# Patient Record
Sex: MALE | Race: OTHER | HISPANIC OR LATINO | URBAN - METROPOLITAN AREA
[De-identification: names, ages, dates, MRNs, and addresses within clinical notes are randomized per-mention and may not be internally consistent; named-entity substitution may affect disease eponyms.]

---

## 2022-04-04 ENCOUNTER — INPATIENT (INPATIENT)
Facility: HOSPITAL | Age: 42
LOS: 0 days | Discharge: ANOTHER HOSPITAL | DRG: 313 | End: 2022-04-05
Attending: STUDENT IN AN ORGANIZED HEALTH CARE EDUCATION/TRAINING PROGRAM | Admitting: STUDENT IN AN ORGANIZED HEALTH CARE EDUCATION/TRAINING PROGRAM
Payer: MEDICAID

## 2022-04-04 PROCEDURE — 99053 MED SERV 10PM-8AM 24 HR FAC: CPT

## 2022-04-04 PROCEDURE — 99285 EMERGENCY DEPT VISIT HI MDM: CPT

## 2022-04-05 VITALS
SYSTOLIC BLOOD PRESSURE: 103 MMHG | HEART RATE: 81 BPM | TEMPERATURE: 98 F | DIASTOLIC BLOOD PRESSURE: 56 MMHG | RESPIRATION RATE: 17 BRPM | OXYGEN SATURATION: 94 %

## 2022-04-05 VITALS
RESPIRATION RATE: 24 BRPM | SYSTOLIC BLOOD PRESSURE: 113 MMHG | HEART RATE: 84 BPM | HEIGHT: 63 IN | WEIGHT: 249.12 LBS | OXYGEN SATURATION: 97 % | TEMPERATURE: 98 F | DIASTOLIC BLOOD PRESSURE: 71 MMHG

## 2022-04-05 DIAGNOSIS — Q21.3 TETRALOGY OF FALLOT: ICD-10-CM

## 2022-04-05 DIAGNOSIS — R07.89 OTHER CHEST PAIN: ICD-10-CM

## 2022-04-05 DIAGNOSIS — Z29.9 ENCOUNTER FOR PROPHYLACTIC MEASURES, UNSPECIFIED: ICD-10-CM

## 2022-04-05 DIAGNOSIS — I24.9 ACUTE ISCHEMIC HEART DISEASE, UNSPECIFIED: ICD-10-CM

## 2022-04-05 LAB
ALBUMIN SERPL ELPH-MCNC: 2.9 G/DL — LOW (ref 3.5–5)
ALP SERPL-CCNC: 99 U/L — SIGNIFICANT CHANGE UP (ref 40–120)
ALT FLD-CCNC: 81 U/L DA — HIGH (ref 10–60)
ANION GAP SERPL CALC-SCNC: 4 MMOL/L — LOW (ref 5–17)
AST SERPL-CCNC: 49 U/L — HIGH (ref 10–40)
BASOPHILS # BLD AUTO: 0.04 K/UL — SIGNIFICANT CHANGE UP (ref 0–0.2)
BASOPHILS NFR BLD AUTO: 0.6 % — SIGNIFICANT CHANGE UP (ref 0–2)
BILIRUB SERPL-MCNC: 0.6 MG/DL — SIGNIFICANT CHANGE UP (ref 0.2–1.2)
BUN SERPL-MCNC: 14 MG/DL — SIGNIFICANT CHANGE UP (ref 7–18)
CALCIUM SERPL-MCNC: 8.8 MG/DL — SIGNIFICANT CHANGE UP (ref 8.4–10.5)
CHLORIDE SERPL-SCNC: 106 MMOL/L — SIGNIFICANT CHANGE UP (ref 96–108)
CO2 SERPL-SCNC: 29 MMOL/L — SIGNIFICANT CHANGE UP (ref 22–31)
CREAT SERPL-MCNC: 0.72 MG/DL — SIGNIFICANT CHANGE UP (ref 0.5–1.3)
D DIMER BLD IA.RAPID-MCNC: 197 NG/ML DDU — SIGNIFICANT CHANGE UP
EGFR: 117 ML/MIN/1.73M2 — SIGNIFICANT CHANGE UP
EOSINOPHIL # BLD AUTO: 0.27 K/UL — SIGNIFICANT CHANGE UP (ref 0–0.5)
EOSINOPHIL NFR BLD AUTO: 4.2 % — SIGNIFICANT CHANGE UP (ref 0–6)
GLUCOSE SERPL-MCNC: 126 MG/DL — HIGH (ref 70–99)
HCT VFR BLD CALC: 47.2 % — SIGNIFICANT CHANGE UP (ref 39–50)
HGB BLD-MCNC: 15.9 G/DL — SIGNIFICANT CHANGE UP (ref 13–17)
IMM GRANULOCYTES NFR BLD AUTO: 0.2 % — SIGNIFICANT CHANGE UP (ref 0–1.5)
LYMPHOCYTES # BLD AUTO: 1.46 K/UL — SIGNIFICANT CHANGE UP (ref 1–3.3)
LYMPHOCYTES # BLD AUTO: 22.7 % — SIGNIFICANT CHANGE UP (ref 13–44)
MCHC RBC-ENTMCNC: 30.1 PG — SIGNIFICANT CHANGE UP (ref 27–34)
MCHC RBC-ENTMCNC: 33.7 GM/DL — SIGNIFICANT CHANGE UP (ref 32–36)
MCV RBC AUTO: 89.2 FL — SIGNIFICANT CHANGE UP (ref 80–100)
MONOCYTES # BLD AUTO: 0.66 K/UL — SIGNIFICANT CHANGE UP (ref 0–0.9)
MONOCYTES NFR BLD AUTO: 10.3 % — SIGNIFICANT CHANGE UP (ref 2–14)
NEUTROPHILS # BLD AUTO: 3.98 K/UL — SIGNIFICANT CHANGE UP (ref 1.8–7.4)
NEUTROPHILS NFR BLD AUTO: 62 % — SIGNIFICANT CHANGE UP (ref 43–77)
NRBC # BLD: 0 /100 WBCS — SIGNIFICANT CHANGE UP (ref 0–0)
NT-PROBNP SERPL-SCNC: 102 PG/ML — SIGNIFICANT CHANGE UP (ref 0–125)
PLATELET # BLD AUTO: 185 K/UL — SIGNIFICANT CHANGE UP (ref 150–400)
POTASSIUM SERPL-MCNC: 3.3 MMOL/L — LOW (ref 3.5–5.3)
POTASSIUM SERPL-SCNC: 3.3 MMOL/L — LOW (ref 3.5–5.3)
PROT SERPL-MCNC: 7.2 G/DL — SIGNIFICANT CHANGE UP (ref 6–8.3)
RBC # BLD: 5.29 M/UL — SIGNIFICANT CHANGE UP (ref 4.2–5.8)
RBC # FLD: 13.4 % — SIGNIFICANT CHANGE UP (ref 10.3–14.5)
SARS-COV-2 RNA SPEC QL NAA+PROBE: SIGNIFICANT CHANGE UP
SODIUM SERPL-SCNC: 139 MMOL/L — SIGNIFICANT CHANGE UP (ref 135–145)
TROPONIN I, HIGH SENSITIVITY RESULT: 6.3 NG/L — SIGNIFICANT CHANGE UP
TROPONIN I, HIGH SENSITIVITY RESULT: 7.6 NG/L — SIGNIFICANT CHANGE UP
WBC # BLD: 6.42 K/UL — SIGNIFICANT CHANGE UP (ref 3.8–10.5)
WBC # FLD AUTO: 6.42 K/UL — SIGNIFICANT CHANGE UP (ref 3.8–10.5)

## 2022-04-05 PROCEDURE — 99254 IP/OBS CNSLTJ NEW/EST MOD 60: CPT

## 2022-04-05 PROCEDURE — 87635 SARS-COV-2 COVID-19 AMP PRB: CPT

## 2022-04-05 PROCEDURE — 36415 COLL VENOUS BLD VENIPUNCTURE: CPT

## 2022-04-05 PROCEDURE — 84484 ASSAY OF TROPONIN QUANT: CPT

## 2022-04-05 PROCEDURE — 71250 CT THORAX DX C-: CPT

## 2022-04-05 PROCEDURE — 83880 ASSAY OF NATRIURETIC PEPTIDE: CPT

## 2022-04-05 PROCEDURE — 99285 EMERGENCY DEPT VISIT HI MDM: CPT

## 2022-04-05 PROCEDURE — 71250 CT THORAX DX C-: CPT | Mod: 26

## 2022-04-05 PROCEDURE — 99223 1ST HOSP IP/OBS HIGH 75: CPT | Mod: GC

## 2022-04-05 PROCEDURE — 93005 ELECTROCARDIOGRAM TRACING: CPT

## 2022-04-05 PROCEDURE — 85379 FIBRIN DEGRADATION QUANT: CPT

## 2022-04-05 PROCEDURE — 85025 COMPLETE CBC W/AUTO DIFF WBC: CPT

## 2022-04-05 PROCEDURE — 71045 X-RAY EXAM CHEST 1 VIEW: CPT

## 2022-04-05 PROCEDURE — 71045 X-RAY EXAM CHEST 1 VIEW: CPT | Mod: 26

## 2022-04-05 PROCEDURE — 80053 COMPREHEN METABOLIC PANEL: CPT

## 2022-04-05 PROCEDURE — 12345: CPT | Mod: NC

## 2022-04-05 RX ORDER — ASPIRIN/CALCIUM CARB/MAGNESIUM 324 MG
162 TABLET ORAL ONCE
Refills: 0 | Status: COMPLETED | OUTPATIENT
Start: 2022-04-05 | End: 2022-04-05

## 2022-04-05 RX ORDER — POTASSIUM CHLORIDE 20 MEQ
20 PACKET (EA) ORAL
Refills: 0 | Status: COMPLETED | OUTPATIENT
Start: 2022-04-05 | End: 2022-04-05

## 2022-04-05 RX ORDER — MORPHINE SULFATE 50 MG/1
4 CAPSULE, EXTENDED RELEASE ORAL ONCE
Refills: 0 | Status: DISCONTINUED | OUTPATIENT
Start: 2022-04-05 | End: 2022-04-05

## 2022-04-05 RX ORDER — ENOXAPARIN SODIUM 100 MG/ML
40 INJECTION SUBCUTANEOUS EVERY 24 HOURS
Refills: 0 | Status: DISCONTINUED | OUTPATIENT
Start: 2022-04-05 | End: 2022-04-05

## 2022-04-05 RX ORDER — ENOXAPARIN SODIUM 100 MG/ML
40 INJECTION SUBCUTANEOUS
Qty: 0 | Refills: 0 | DISCHARGE

## 2022-04-05 RX ADMIN — MORPHINE SULFATE 4 MILLIGRAM(S): 50 CAPSULE, EXTENDED RELEASE ORAL at 03:26

## 2022-04-05 RX ADMIN — ENOXAPARIN SODIUM 40 MILLIGRAM(S): 100 INJECTION SUBCUTANEOUS at 05:49

## 2022-04-05 RX ADMIN — Medication 20 MILLIEQUIVALENT(S): at 04:07

## 2022-04-05 RX ADMIN — Medication 20 MILLIEQUIVALENT(S): at 05:37

## 2022-04-05 RX ADMIN — Medication 162 MILLIGRAM(S): at 03:26

## 2022-04-05 NOTE — CHART NOTE - NSCHARTNOTEFT_GEN_A_CORE
Spoke with Dr. Osmar Flor from [facility]. Pt to be transferred to [facility] for further work up and management. Dr. Flor has contacted the transfer center. Dr. Taylor notified.

## 2022-04-05 NOTE — CHART NOTE - NSCHARTNOTEFT_GEN_A_CORE
Pt admitted for ACS rule out.  Dr. Manuel, cardiology, consulted.  Spoke with pts cardiologist at [facility] and the decision was made to transfer the patient.  Discharge started, awaiting CB from [facility] transfer center for  time.

## 2022-04-05 NOTE — H&P ADULT - ASSESSMENT
41 year old male with a past medical history of Tetralogy of Fallot s/p surgical correction coming to ED complaining of left sided substernal chest pressure admitted for ACS rule out.

## 2022-04-05 NOTE — H&P ADULT - PROBLEM SELECTOR PLAN 2
s/p surgical correction 5 years and 2 years ago at [facility] s/p surgical correction 5 years and 2 years ago at [facility]  follow ECHO.

## 2022-04-05 NOTE — CONSULT NOTE ADULT - SUBJECTIVE AND OBJECTIVE BOX
CHIEF COMPLAINT: chest pain    HPI:  41 years old male with PMHx tet    PAST MEDICAL & SURGICAL HISTORY:  Cardiac anomaly, congenital        Allergies    Allergy Status Unknown    Intolerances        MEDICATIONS  (STANDING):  enoxaparin Injectable 40 milliGRAM(s) SubCutaneous every 24 hours    MEDICATIONS  (PRN):      FAMILY HISTORY:      ***No family history of premature coronary artery disease or sudden cardiac death    SOCIAL HISTORY:  Smoking-  Alcohol-  Illicit Drug use-    REVIEW OF SYSTEMS:  Constitutional: [ ] fever, [ ]weight loss,  [ ]fatigue  Eyes: [ ] visual changes  Respiratory: [ ]shortness of breath;  [ ] cough, [ ]wheezing, [ ]chills, [ ]hemoptysis  Cardiovascular: [ ] chest pain, [ ]palpitations, [ ]dizziness,  [ ]leg swelling [ ]syncope  Gastrointestinal: [ ] abdominal pain, [ ]nausea, [ ]vomiting,  [ ]diarrhea   Genitourinary: [ ] dysuria, [ ] hematuria  Neurologic: [ ] headaches [ ] tremors  [ ] weakness [ ] lightheadedness  Skin: [ ] itching, [ ]burning, [ ] rashes  Endocrine: [ ] heat or cold intolerance  Musculoskeletal: [ ] joint pain or swelling; [ ] muscle, back, or extremity pain  Psychiatric: [ ] depression, [ ]anxiety, [ ]mood swings, or [ ]difficulty sleeping  Hematologic: [ ] easy bruising, [ ] bleeding gums     [ x] All others negative	  [ ] Unable to obtain    Vital Signs Last 24 Hrs  T(C): 36.5 (05 Apr 2022 11:10), Max: 37 (05 Apr 2022 04:36)  T(F): 97.7 (05 Apr 2022 11:10), Max: 98.6 (05 Apr 2022 04:36)  HR: 76 (05 Apr 2022 11:10) (73 - 96)  BP: 100/60 (05 Apr 2022 11:10) (100/60 - 114/72)  BP(mean): --  RR: 18 (05 Apr 2022 11:10) (18 - 24)  SpO2: 95% (05 Apr 2022 11:10) (95% - 97%)  I&O's Summary      PHYSICAL EXAM:  General: No acute distress  HEENT: EOMI  Neck:  No JVD  Lungs: Clear to auscultation bilaterally; No rales or wheezing  Heart: Regular rate and rhythm; No murmurs, rubs, or gallops  Abdomen: soft, non tender, non distended   Extremities: warm, no edema   Nervous system:  Alert & Oriented X3  Psychiatric: Normal affect  Skin: No rashes or lesions    LABS:  04-05    139  |  106  |  14  ----------------------------<  126<H>  3.3<L>   |  29  |  0.72    Ca    8.8      05 Apr 2022 00:56    TPro  7.2  /  Alb  2.9<L>  /  TBili  0.6  /  DBili  x   /  AST  49<H>  /  ALT  81<H>  /  AlkPhos  99  04-05    Creatinine Trend: 0.72<--                        15.9   6.42  )-----------( 185      ( 05 Apr 2022 00:56 )             47.2         Lipid Panel:   Cardiac Enzymes:     Serum Pro-Brain Natriuretic Peptide: 102 pg/mL (04-05-22 @ 00:56)        RADIOLOGY:    ECG [my interpretation]:    TELEMETRY:    ECHO:    STRESS TEST:    CATHETERIZATION: CHIEF COMPLAINT: chest pain    HPI:  41 years old male with PMHx tetralogy of fallot and PShx repair of tetralogy of fallot 2013 and 2020 presented to the hospital with c/o left chest pressure that radiated to the back while working. Patient works as a cook. Patient describes pain as pressure, associated with palpitations, lightheadedness and a little diaphoresis.  Patient denies syncope, dizziness, shortness of breath, LE edema, PND/orthopnea.     PAST MEDICAL & SURGICAL HISTORY:  Cardiac anomaly, congenital  Allergies    Allergy Status Unknown    Intolerances        MEDICATIONS  (STANDING):  enoxaparin Injectable 40 milliGRAM(s) SubCutaneous every 24 hours    MEDICATIONS  (PRN):      FAMILY HISTORY:  Diabetes-Father    SOCIAL HISTORY:  Smoking-denies  Alcohol-denies  Illicit Drug use-denies    REVIEW OF SYSTEMS:  Constitutional: [ ] fever, [ ]weight loss,  [ ]fatigue  Eyes: [ ] visual changes  Respiratory: [ ]shortness of breath;  [ ] cough, [ ]wheezing, [ ]chills, [ ]hemoptysis  Cardiovascular: [x ] chest pain, [x ]palpitations, [ ]dizziness,  [ ]leg swelling [ ]syncope  Gastrointestinal: [ ] abdominal pain, [ ]nausea, [ ]vomiting,  [ ]diarrhea   Genitourinary: [ ] dysuria, [ ] hematuria  Neurologic: [ ] headaches [ ] tremors  [ ] weakness [x ] lightheadedness  Skin: [ ] itching, [ ]burning, [ ] rashes  Endocrine: [ ] heat or cold intolerance  Musculoskeletal: [ ] joint pain or swelling; [ ] muscle, back, or extremity pain  Psychiatric: [ ] depression, [ ]anxiety, [ ]mood swings, or [ ]difficulty sleeping  Hematologic: [ ] easy bruising, [ ] bleeding gums     [ x] All others negative	  [ ] Unable to obtain    Vital Signs Last 24 Hrs  T(C): 36.5 (05 Apr 2022 11:10), Max: 37 (05 Apr 2022 04:36)  T(F): 97.7 (05 Apr 2022 11:10), Max: 98.6 (05 Apr 2022 04:36)  HR: 76 (05 Apr 2022 11:10) (73 - 96)  BP: 100/60 (05 Apr 2022 11:10) (100/60 - 114/72)  BP(mean): --  RR: 18 (05 Apr 2022 11:10) (18 - 24)  SpO2: 95% (05 Apr 2022 11:10) (95% - 97%)  I&O's Summary      PHYSICAL EXAM:  General: No acute distress  HEENT: EOMI  Neck:  No JVD  Lungs: Clear to auscultation bilaterally; No rales or wheezing  Heart: Regular rate and rhythm; + murmurs (pulmonic area), rubs, or gallops, + tenderness on palpation- left chest area  Abdomen: soft, non tender, non distended   Extremities: warm, no edema   Nervous system:  Alert & Oriented X3  Psychiatric: Normal affect  Skin: No rashes or lesions    LABS:  04-05    139  |  106  |  14  ----------------------------<  126<H>  3.3<L>   |  29  |  0.72    Ca    8.8      05 Apr 2022 00:56    TPro  7.2  /  Alb  2.9<L>  /  TBili  0.6  /  DBili  x   /  AST  49<H>  /  ALT  81<H>  /  AlkPhos  99  04-05    Creatinine Trend: 0.72<--                        15.9   6.42  )-----------( 185      ( 05 Apr 2022 00:56 )             47.2         Serum Pro-Brain Natriuretic Peptide: 102 pg/mL (04-05-22 @ 00:56)        RADIOLOGY:  < from: Xray Chest 1 View- PORTABLE-Urgent (04.05.22 @ 02:06) >  IMPRESSION: Heart enlargement and sternotomy.    --- End of Report ---      < end of copied text >      ECG [my interpretation]:  4/5/2020  NSR HR 84, RBBB     TELEMETRY:  NSR HR 70-100bpm

## 2022-04-05 NOTE — CONSULT NOTE ADULT - PROBLEM SELECTOR RECOMMENDATION 9
- likely not cardiac etiology since the chest pain increases with palpation of the area  - obtain echo for structural abnormalities  -obtain CT chest-s/p sternotomy to r/o wire fractures  -lipid panel, HgbA1C  -likely nuclear stress test in am

## 2022-04-05 NOTE — ED PROVIDER NOTE - OBJECTIVE STATEMENT
Chief complaint of left sternal chest pain x 2 days. No fever, no coughing. Denies shortness of breath.  Pmhx tertratolgy of fellot -surgical correction at [facility] when pt was 36 and 39 years old.  Meds: None

## 2022-04-05 NOTE — DISCHARGE NOTE PROVIDER - CARE PROVIDER_API CALL
GIULIANO GONZALEZ  Thoracic Surgery (Cardiothoracic Vascular Surgery)  [address]  [place] [address]  Phone: ([phone]  Fax: ()-  Follow Up Time: 1-3 days

## 2022-04-05 NOTE — ED PROVIDER NOTE - CLINICAL SUMMARY MEDICAL DECISION MAKING FREE TEXT BOX
Cardiac risk factors: Elevated BMI, RBBB on EKG, hx of congential cardiac malformations.  MAR and Dr. Griggs endorsed. Pt agrees with admission for cardiac monitoring.  I had a detailed discussion with the patient and/or guardian regarding the historical points, exam findings, and any diagnostic results supporting the admit diagnosis.

## 2022-04-05 NOTE — DISCHARGE NOTE PROVIDER - NSDCCPCAREPLAN_GEN_ALL_CORE_FT
PRINCIPAL DISCHARGE DIAGNOSIS  Diagnosis: ACS (acute coronary syndrome)  Assessment and Plan of Treatment: [facility]  The cardiologist at [place] consulted with your cardiologist at [facility].  The decision was made to transfer you to the cardiologist hospital for continuity of care.

## 2022-04-05 NOTE — H&P ADULT - PROBLEM SELECTOR PLAN 1
Left sided chest pressure that started on Sunday. Does not endorse any pain or trouble breathing  Trop x1 negative  EKG showed Left bundle branch block, no evidence of ischemia  Received 162 mg of ASA and 4 mg morphine which resolved chest pressure  Cardio consulted  Cardiac monitoring   f/u Hba1c, Lipid profile  f/u official chest xray read Left sided chest pressure that started on Sunday. Does not endorse any pain or trouble breathing  Trop x1 negative  EKG showed NSR Right bundle branch block, no evidence of ischemia  Received 162 mg of ASA and 4 mg morphine which resolved chest pressure  Cardio consulted  Cardiac monitoring   f/u Hba1c, Lipid profile  f/u official chest xray read Left sided chest pressure that started on Sunday. Does not endorse any pain or trouble breathing  Trop x1 negative  EKG showed NSR Right bundle branch block, no evidence of ischemia  Received 162 mg of ASA and 4 mg morphine which resolved chest pressure  Cardio consulted Dr. Moser   Cardiac monitoring   f/u Hba1c, Lipid profile  f/u official chest xray read  f/u trop 2  f/u echo Left sided chest pressure that started on Sunday. Does not endorse any pain or trouble breathing.  presentation atypical, HEART score 1 low. pain reproducible on plapation.  Trop x1 negative  EKG showed NSR Right bundle branch block, no evidence of ischemia  Received 162 mg of ASA and 4 mg morphine which resolved chest pressure.   Cardio consulted Dr. Moser   Cardiac monitoring   f/u Hba1c, Lipid profile  f/u official chest xray read  f/u trop 2, trop 3  f/u echo

## 2022-04-05 NOTE — PATIENT PROFILE ADULT - FALL HARM RISK - UNIVERSAL INTERVENTIONS
Bed in lowest position, wheels locked, appropriate side rails in place/Call bell, personal items and telephone in reach/Instruct patient to call for assistance before getting out of bed or chair/Non-slip footwear when patient is out of bed/[place] to call system/Physically safe environment - no spills, clutter or unnecessary equipment/Purposeful Proactive Rounding/Room/bathroom lighting operational, light cord in reach

## 2022-04-05 NOTE — CONSULT NOTE ADULT - NS ATTEND AMEND GEN_ALL_CORE FT
41M w tetralogy of fallot s/p repair in 2013 and 2020 presented w cp. Pt states he noted cp on Sunday evening, L sided, burning pain, non radiating, non exertional, 10/10 in intensity and was exacerbated by touching his L chest area. Denies trauma to this area. The next day he went to work and continued to have burning sensation and presented to ED. He denies dizziness, palpitations, syncope. Last saw his CTS 1 year ago. Does not follow w PMD. Can perform 4 METS without cp/sob. He received morphine and asa in ED. This AM he states he has no chest discomfort but on palpation of his L chest wall he feels same pain.     Vitals noted    PHYSICAL EXAM:  General: No acute distress  HEENT: EOMI  Neck:  No JVD  Lungs: Clear to auscultation bilaterally; No rales or wheezing  Heart: Regular rate and rhythm; III/VI DHEERAJ in LUSB, I/VI DHEERAJ at apex   Abdomen: soft, non tender, non distended   Extremities: warm, no edema   Nervous system:  Alert & Oriented X3  Psychiatric: Normal affect  Skin: No rashes or lesions on torso     Labs notable for ddimer negative, trop negative x2.     ECG SR with RBBB  Tele: no events   CXR results noted     41M w tetralogy of fallot s/p staged repair in 2013 and 2020 presented w cp. Pain is reproducible on exam, making coronary etiology less likely. ACS has been r/o by negative trop and ECG without ischemic changes. He has loud murmur in the pulmonic region, likely had PVR given the valv enoted on CXR.   - TTE to assess RV size and function and pulmonic valve and also eval for ? RVOT stenosis given the systolic murmur, as well as possible residual shunts    - No obvious sternal wire fracture on CXR but given significant pain on palpation, dedicated imaging of chest wall should be performed to r/o rib fracture/sternal wire fracture.  - continue tele monitoring - no arrhythmias noted on tele and no h/o dizziness/syncope on my interview with the pt   - based on above results will consider further ischemic eval  - f/u A1c and lipid panel for risk factor assessment   - called Dr. Nikolas Benavidez's office ([facility]) [phone], awaiting reply for further information re details of pt's surgeries and work up

## 2022-04-05 NOTE — DISCHARGE NOTE PROVIDER - HOSPITAL COURSE
41 year old male with a past medical history of Tetralogy of Fallot s/p surgical correction at [facility] 5 years ago and 2 years ago coming to ED c/o of left substernal squeezing chest pressure that started sunday morning. Patient states that the pressure started out of no where and eventually was unable to move his left arm due to increasing pressure. He denies any chest pain (exertional and nonexertional) or any trouble breathing.  Dr. Manuel, cardiology, consulted.  Dr. Manuel spoke with patients cardiologist at [facility] and a mutual decision was made to transfer the patient.      Case discussed with attending.  Given patient's clinical status and current hemodynamic stability, the decision was made for transfer.    This is a summary of the patients hospitalization.  For full hospital course, please see medical record. 41 year old male with a past medical history of Tetralogy of Fallot s/p surgical correction at [facility] 5 years ago and 2 years ago coming to ED c/o of left substernal squeezing chest pressure that started sunday morning. Patient states that the pressure started out of no where and eventually was unable to move his left arm due to increasing pressure. reports exertional chest pain. Troponin negative x2.  Dr. Manuel, cardiology, consulted.  Dr. Manuel spoke with patients cardiologist at [facility] and a mutual decision was made to transfer the patient.      Case discussed with attending.  Given patient's clinical status and current hemodynamic stability, the decision was made for transfer.    This is a summary of the patients hospitalization.  For full hospital course, please see medical record.

## 2022-04-05 NOTE — PATIENT PROFILE ADULT - VISION (WITH CORRECTIVE LENSES IF THE PATIENT USUALLY WEARS THEM):
has difficulty seeing small print/Partially impaired: cannot see medication labels or newsprint, but can see obstacles in path, and the surrounding layout; can count fingers at arm's length

## 2022-04-05 NOTE — CONSULT NOTE ADULT - ASSESSMENT
41 years old male with PMHx tetralogy of fallot and PShx repair of tetralogy of fallot 2013 and 2020 presented to the hospital with c/o left chest pressure that radiated to the back while working. Patient works as a cook. Patient describes pain as pressure, associated with palpitations, lightheadedness and a little diaphoresis.  Patient denies syncope, dizziness, shortness of breath, LE edema, PND/orthopnea.  Patient was admitted for further evaluation.  Cardiology was consulted

## 2022-04-05 NOTE — H&P ADULT - HISTORY OF PRESENT ILLNESS
41 year old male with a past medical history of Tetralogy of Fallot s/p surgical correction at [facility] 5 years ago and 2 years ago coming to ED c/o of left substernal squeezing chest pressure that started sunday morning. Patient states that the pressure started out of no where and eventually was unable to move his left arm due to increasing pressure. He denies any chest pain (exertional and nonexertional) or any trouble breathing. Patient does endorse having episodes of diaphoresis Denies any headaches, dizziness, fevers, chills, cough, chest pain, SOB, abdominal pain, n/v, diarrhea, constipation, hematuria, dysuria, and rashes.    In ED VS: T 98.3, HR 84, /71, RR, Spo2 97%.  Trop 6.3, D dimer 197   EKG showed Left bundle branch block, no evidence of ischemia     41 year old male with a past medical history of Tetralogy of Fallot s/p surgical correction at [facility] 5 years ago and 2 years ago coming to ED c/o of left substernal squeezing chest pressure that started sunday morning. Patient states that the pressure started out of no where and eventually was unable to move his left arm due to increasing pressure. He denies any chest pain (exertional and nonexertional) or any trouble breathing. Patient does endorse having episodes of diaphoresis Denies any headaches, dizziness, fevers, chills, cough, chest pain, SOB, abdominal pain, n/v, diarrhea, constipation, hematuria, dysuria, and rashes.    In ED VS: T 98.3, HR 84, /71, RR, Spo2 97%.  Trop 6.3, D dimer 197   EKG showed NSR Right bundle branch block, no evidence of ischemia

## 2022-04-05 NOTE — DISCHARGE NOTE PROVIDER - NSDCPNSUBOBJ_GEN_ALL_CORE
#Atypical Chest Pain, concern for Acute Coronary Syndrome  #H/o Tetrology of Fallot s/p Surgical Revision    Patient presented with acute chest pain of unclear etiology. Troponin negative, no EKG changes. Cardiology consulted who spoke with patient primary cardiologist. Given complexity of anatomy, decision made to transfer patient to [facility] for further work-up. Dr. Osmar Walker, accepting physician at [facility].

## 2022-04-05 NOTE — ED ADULT NURSE NOTE - NSIMPLEMENTINTERV_GEN_ALL_ED
Implemented All Universal Safety Interventions:  [place] to call system. Call bell, personal items and telephone within reach. Instruct patient to call for assistance. Room bathroom lighting operational. Non-slip footwear when patient is off stretcher. Physically safe environment: no spills, clutter or unnecessary equipment. Stretcher in lowest position, wheels locked, appropriate side rails in place.
